# Patient Record
Sex: FEMALE | Race: BLACK OR AFRICAN AMERICAN
[De-identification: names, ages, dates, MRNs, and addresses within clinical notes are randomized per-mention and may not be internally consistent; named-entity substitution may affect disease eponyms.]

---
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
## 2020-06-05 ENCOUNTER — HOSPITAL ENCOUNTER (EMERGENCY)
Dept: HOSPITAL 63 - ER | Age: 20
Discharge: HOME | End: 2020-06-05
Payer: COMMERCIAL

## 2020-06-05 VITALS — HEIGHT: 70 IN | WEIGHT: 293 LBS | BODY MASS INDEX: 41.95 KG/M2

## 2020-06-05 VITALS
DIASTOLIC BLOOD PRESSURE: 80 MMHG | DIASTOLIC BLOOD PRESSURE: 80 MMHG | SYSTOLIC BLOOD PRESSURE: 145 MMHG | SYSTOLIC BLOOD PRESSURE: 145 MMHG | SYSTOLIC BLOOD PRESSURE: 145 MMHG | DIASTOLIC BLOOD PRESSURE: 80 MMHG

## 2020-06-05 DIAGNOSIS — O26.891: Primary | ICD-10-CM

## 2020-06-05 DIAGNOSIS — R11.0: ICD-10-CM

## 2020-06-05 DIAGNOSIS — F12.10: ICD-10-CM

## 2020-06-05 DIAGNOSIS — O99.211: ICD-10-CM

## 2020-06-05 DIAGNOSIS — R19.7: ICD-10-CM

## 2020-06-05 DIAGNOSIS — O99.331: ICD-10-CM

## 2020-06-05 DIAGNOSIS — O99.321: ICD-10-CM

## 2020-06-05 DIAGNOSIS — E66.01: ICD-10-CM

## 2020-06-05 DIAGNOSIS — R10.13: ICD-10-CM

## 2020-06-05 DIAGNOSIS — Z3A.01: ICD-10-CM

## 2020-06-05 LAB
ALBUMIN SERPL-MCNC: 3 G/DL (ref 3.4–5)
ALP SERPL-CCNC: 76 U/L (ref 46–116)
ALT SERPL-CCNC: 22 U/L (ref 14–59)
AMPHETAMINE/METHAMPHETAMINE: (no result)
ANION GAP SERPL CALC-SCNC: 10 MMOL/L (ref 6–14)
APTT PPP: YELLOW S
AST SERPL-CCNC: 17 U/L (ref 15–37)
BACTERIA #/AREA URNS HPF: (no result) /HPF
BARBITURATES UR-MCNC: (no result) UG/ML
BASOPHILS # BLD AUTO: 0 X10^3/UL (ref 0–0.2)
BASOPHILS NFR BLD: 0 % (ref 0–3)
BENZODIAZ UR-MCNC: (no result) UG/L
BILIRUB DIRECT SERPL-MCNC: 0.1 MG/DL (ref 0–0.2)
BILIRUB SERPL-MCNC: 0.3 MG/DL (ref 0.2–1)
BILIRUB UR QL STRIP: (no result)
CA-I SERPL ISE-MCNC: 5 MG/DL (ref 7–20)
CALCIUM SERPL-MCNC: 8.8 MG/DL (ref 8.5–10.1)
CANNABINOIDS UR-MCNC: (no result) UG/L
CHLORIDE SERPL-SCNC: 102 MMOL/L (ref 98–107)
CO2 SERPL-SCNC: 25 MMOL/L (ref 21–32)
COCAINE UR-MCNC: (no result) NG/ML
CREAT SERPL-MCNC: 0.8 MG/DL (ref 0.6–1)
EOSINOPHIL NFR BLD: 0.1 X10^3/UL (ref 0–0.7)
EOSINOPHIL NFR BLD: 1 % (ref 0–3)
ERYTHROCYTE [DISTWIDTH] IN BLOOD BY AUTOMATED COUNT: 15.5 % (ref 11.5–14.5)
FIBRINOGEN PPP-MCNC: (no result) MG/DL
GFR SERPLBLD BASED ON 1.73 SQ M-ARVRAT: 110.7 ML/MIN
GLUCOSE SERPL-MCNC: 96 MG/DL (ref 70–99)
GLUCOSE UR STRIP-MCNC: (no result) MG/DL
HCT VFR BLD CALC: 38.7 % (ref 36–47)
HGB BLD-MCNC: 12.7 G/DL (ref 12–15.5)
LIPASE: 29 U/L (ref 73–393)
LYMPHOCYTES # BLD: 2.6 X10^3/UL (ref 1–4.8)
LYMPHOCYTES NFR BLD AUTO: 26 % (ref 24–48)
MCH RBC QN AUTO: 28 PG (ref 25–35)
MCHC RBC AUTO-ENTMCNC: 33 G/DL (ref 31–37)
MCV RBC AUTO: 84 FL (ref 79–100)
METHADONE SERPL-MCNC: (no result) NG/ML
MONO #: 0.8 X10^3/UL (ref 0–1.1)
MONOCYTES NFR BLD: 8 % (ref 0–9)
NEUT #: 6.7 X10^3UL (ref 1.8–7.7)
NEUTROPHILS NFR BLD AUTO: 66 % (ref 31–73)
NITRITE UR QL STRIP: (no result)
OPIATES UR-MCNC: (no result) NG/ML
PCP SERPL-MCNC: (no result) MG/DL
PLATELET # BLD AUTO: 344 X10^3/UL (ref 140–400)
POTASSIUM SERPL-SCNC: 3.6 MMOL/L (ref 3.5–5.1)
PROT SERPL-MCNC: 7.2 G/DL (ref 6.4–8.2)
RBC # BLD AUTO: 4.62 X10^6/UL (ref 3.5–5.4)
RBC #/AREA URNS HPF: 0 /HPF (ref 0–2)
SODIUM SERPL-SCNC: 137 MMOL/L (ref 136–145)
SP GR UR STRIP: 1.01
SQUAMOUS #/AREA URNS LPF: (no result) /LPF
UROBILINOGEN UR-MCNC: 0.2 MG/DL
WBC # BLD AUTO: 10.1 X10^3/UL (ref 4–11)
WBC #/AREA URNS HPF: (no result) /HPF (ref 0–4)

## 2020-06-05 PROCEDURE — 86901 BLOOD TYPING SEROLOGIC RH(D): CPT

## 2020-06-05 PROCEDURE — 93005 ELECTROCARDIOGRAM TRACING: CPT

## 2020-06-05 PROCEDURE — 96361 HYDRATE IV INFUSION ADD-ON: CPT

## 2020-06-05 PROCEDURE — 85610 PROTHROMBIN TIME: CPT

## 2020-06-05 PROCEDURE — 84702 CHORIONIC GONADOTROPIN TEST: CPT

## 2020-06-05 PROCEDURE — 81001 URINALYSIS AUTO W/SCOPE: CPT

## 2020-06-05 PROCEDURE — 76817 TRANSVAGINAL US OBSTETRIC: CPT

## 2020-06-05 PROCEDURE — 83690 ASSAY OF LIPASE: CPT

## 2020-06-05 PROCEDURE — 36415 COLL VENOUS BLD VENIPUNCTURE: CPT

## 2020-06-05 PROCEDURE — 86900 BLOOD TYPING SEROLOGIC ABO: CPT

## 2020-06-05 PROCEDURE — 82550 ASSAY OF CK (CPK): CPT

## 2020-06-05 PROCEDURE — 81025 URINE PREGNANCY TEST: CPT

## 2020-06-05 PROCEDURE — 80307 DRUG TEST PRSMV CHEM ANLYZR: CPT

## 2020-06-05 PROCEDURE — 96375 TX/PRO/DX INJ NEW DRUG ADDON: CPT

## 2020-06-05 PROCEDURE — 80048 BASIC METABOLIC PNL TOTAL CA: CPT

## 2020-06-05 PROCEDURE — 80076 HEPATIC FUNCTION PANEL: CPT

## 2020-06-05 PROCEDURE — 96374 THER/PROPH/DIAG INJ IV PUSH: CPT

## 2020-06-05 PROCEDURE — 99285 EMERGENCY DEPT VISIT HI MDM: CPT

## 2020-06-05 PROCEDURE — 85025 COMPLETE CBC W/AUTO DIFF WBC: CPT

## 2020-06-05 PROCEDURE — 84484 ASSAY OF TROPONIN QUANT: CPT

## 2020-06-05 NOTE — PHYS DOC
Past History


Past Medical History:  No Pertinent History, Heart Disease


Past Surgical History:  No Surgical History


Alcohol Use:  None


Drug Use:  None





General Adult


EDM:


Chief Complaint:  ABDOMINAL PAIN





HPI:


HPI:


"... I ve been sick since Thursday...  Maybe diarrhea 3 x. day.. really 

nauseated.. feel like I need to vomit... My period is 2 weeks late... I hurt 

right up here in my Epigastric area...








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 

complaints epigastric pain for the last 2 days.  No history of bad food and 

intake.  No history of trauma.  No history of ill contacts.  No  

immunosuppression.  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 

individuals.  No recent travel outside [facility].  No history 

immunosuppression.  Patient states she is late on her period for approximately 6

weeks.





Review of Systems:


Review of Systems:


Constitutional:  Denies fever or chills 


Eyes:  Denies change in visual acuity 


HENT:  Denies nasal congestion or sore throat 


Respiratory:  Denies cough or shortness of breath 


Cardiovascular:  Denies chest pain or edema 


GI:  Complaints abdominal pain, nausea, and  diarrhea 


: Denies dysuria 


Musculoskeletal:  Denies back pain or joint pain 


Integument:  Denies rash 


Neurologic:  Denies headache, focal weakness or sensory changes 


Endocrine:  Denies polyuria or polydipsia 


Lymphatic:  Denies swollen glands 


Psychiatric:  Denies depression or anxiety





Heart Score:


HEART Score for Chest Pain:  








HEART Score for Chest Pain Response (Comments) Value


 


History Slighlty/Non-Suspicious 0


 


ECG Normal 0


 


Age < 45 0


 


Risk Factors No Risk Factors 0


 


Troponin < Normal Limit 0


 


Total  0








Risk Factors:


Risk Factors:  DM, Current or recent (<one month) smoker, HTN, HLP, family 

history of CAD, obesity.


Risk Scores:


Score 0 - 3:  2.5% MACE over next 6 weeks - Discharge Home


Score 4 - 6:  20.3% MACE over next 6 weeks - Admit for Clinical Observation


Score 7 - 10:  72.7% MACE over next 6 weeks - Early Invasive Strategies





Family History:


Family History:


Noncontributory





Current Medications:


Current Meds:


See nursing for home meds





Allergies:


Allergies:





Allergies








Coded Allergies Type Severity Reaction Last Updated Verified


 


  No Known Drug Allergies    19 No











Physical Exam:


PE:





Constitutional: Moderate acute distress, non-toxic appearance. []


HENT: Normocephalic, atraumatic, bilateral external ears normal, oropharynx 

moist, no oral exudates, nose normal. []


Eyes: PERRLA, EOMI, conjunctiva normal, no discharge. [] 


Neck: Normal range of motion, no tenderness, supple, no stridor. [] 


Cardiovascular: Tachycardia heart rate regular rhythm, no murmur []


Lungs & Thorax:  Bilateral breath sounds clear to auscultation []


Abdomen: Bowel sounds hyperactive, soft, epigastric and right upper quadrant te

nderness, no masses, no pulsatile masses.  Morbidly obese.


Skin: Warm, dry, no erythema, no rash. [] 


Back: No tenderness, no CVA tenderness. [] 


Extremities: No tenderness, no cyanosis, no clubbing, ROM intact, ankle edema. 

[] No cording appreciated


Neurologic: Alert and oriented X 3, normal motor function, normal sensory 

function, no focal deficits noted. []


Psychologic: Affect anxious,, judgement normal, mood normal. []





EKG:


EKG:


My interpretation of EKG shows a sinus rhythm at 93 bpm.  No findings of acute 

STEMI with contralateral changes.  []





Radiology/Procedures:


Radiology/Procedures:


[][facility] [address], [place] [address]


                                 [phone]


                                        


                                 IMAGING REPORT





                                     Signed





PATIENT: RONNY SHANE ACCOUNT: [id number]     MRN#: [id number]


: 1974           LOCATION: ER              AGE: 46


SEX: F                    EXAM DT: 20         ACCESSION#: [id number]


STATUS: PRE ER            ORD. PHYSICIAN: LUTHER ENNIS MD


REASON: cough


PROCEDURE: CHEST PA & LATERAL





EXAM: AP View of the chest


 


DATE: 2020 4:17 AM


 


INDICATION: Cough


 


COMPARISON: 2020


 


FINDINGS:


The heart is not enlarged.


 


Mediastinal and hilar contours are stable.


 


Patchy right lung base parenchymal opacities may represent atelectasis or 


developing consolidation, more prominent than 2020.


 


No pleural effusion or pneumothorax.


 


 


 


IMPRESSION:


Patchy right lung base parenchymal opacities may represent atelectasis or 


developing consolidation, more prominent than 2020.


 


Electronically signed by: Abdiaziz Sanchez MD (2020 5:09 AM) [facility]














DICTATED AND SIGNED BY:     ABDIAZIZ SANCHEZ MD


DATE:     20 0509





CC: LUTHER ENNIS MD; PCP,NO ~





Course & Med Decision Making:


Course & Med Decision Making


Pertinent Labs and Imaging studies reviewed. (See chart for details)





Patient follow-up labs with primary care.  Patient push fluids.  Patient take a 

prenatal vitamin.  Patient follow-up OB.  Patient recommended not to smoke or 

use marijuana.  Patient be on a clear fluid diet for the next 24 hours.  Avoid 

solids and milks to allow bowel rest.  Patient push clear fluids and fruit 

juices.  Must follow-up.  





Impression-





1.  Intrauterine pregnancy approximately 6 weeks


2.  Complaints of nausea


3.  Blood type is O+


4.  Hemoglobin 12.7


5.  Beta-hCG is 15,774


6.  Tobacco and marijuana use


7.  Morbid obesity


8.  History of diarrhea








[]





Dragon Disclaimer:


Dragon Disclaimer:


This electronic medical record was generated, in whole or in part, using a voice

 recognition dictation system.





Departure


Departure:


Disposition:  01 HOME/RESIDENCE PRIOR TO ADM


Condition:  STABLE


Referrals:  


PCP,ELEONORA (PCP)





Justification of Admission:


Justification of Admission:


Justification of Admission Dx:  N/A





Dragon Disclaimer


This chart was dictated in whole or in part using Voice Recognition software in 

a busy, high-work load, and often noisy Emergency Department environment.  It 

may contain unintended and wholly unrecognized errors or omissions.











LUTHER ENNIS MD            2020 18:24

## 2020-06-05 NOTE — RAD
CLINICAL HISTORY: Reason: pain, nausea / Spl. Instructions:  / History: 

 

COMPARISON: None available.

 

TECHNIQUE:  Endovaginal sonography was performed

 

FINDINGS: 

An intrauterine  gestational sac is present.  A yolk sac is identified. 

Equivocal fetal pole with cardiac activity is identified, heart rate 

measures approximately 93 beats per minute. There is no subchorionic fluid

collection.

 

Based on a crown rump length averaging 0.34 cm, the estimated gestational 

age is 6 weeks, 0 days.  Estimated date of delivery is 1/29/2021.

 

The ovaries are not visualized. There is small volume pelvic free fluid.

 

IMPRESSION: 

1. A gestational sac with yolk sac is identified. Equivocal fetal pole is 

identified with fetal heart rate of 93 bpm, bradycardia. Recommend short 

normal follow-up ultrasound in one week and follow-up serial hCG. 

Estimated gestational age is 6 weeks 0 days with an estimated date of 

delivery 1/29/2021.

 

Electronically signed by: Abdiaziz Sanchez MD (6/5/2020 11:07 PM) [facility]

## 2020-06-08 NOTE — EKG
[facility] [address], [place] [address]

Test Date:    2020               Test Time:    18:29:59

Pat Name:     YOEL SUAREZ        Department:   

Patient ID:   [id number]           Room:          

Gender:                                Technician:   

:          2000               Requested By: LUTHER ENNIS

Order Number: [id number]            Reading MD:   Charbel Ricci MD

                                 Measurements

Intervals                              Axis          

Rate:                                  P:            

MO:                                    QRS:          

QRSD:                                  T:            

QT:                                                  

QTc:                                                 

                           Interpretive Statements

SR

NON-SPECIFIC ST/T CHANGES



Electronically Signed On 2020 11:14:27 CDT by Charbel Ricci MD

## 2021-02-12 VITALS — DIASTOLIC BLOOD PRESSURE: 76 MMHG | SYSTOLIC BLOOD PRESSURE: 119 MMHG

## 2022-01-10 ENCOUNTER — HOSPITAL ENCOUNTER (OUTPATIENT)
Dept: HOSPITAL 61 - LAB | Age: 22
End: 2022-01-10
Attending: OBSTETRICS & GYNECOLOGY
Payer: COMMERCIAL

## 2022-01-10 DIAGNOSIS — O09.71: Primary | ICD-10-CM

## 2022-01-10 LAB
ALBUMIN SERPL-MCNC: 3.2 G/DL (ref 3.4–5)
ALBUMIN/GLOB SERPL: 0.8 {RATIO} (ref 1–1.7)
ALP SERPL-CCNC: 73 U/L (ref 46–116)
ALT SERPL-CCNC: 21 U/L (ref 14–59)
ANION GAP SERPL CALC-SCNC: 11 MMOL/L (ref 6–14)
AST SERPL-CCNC: 13 U/L (ref 15–37)
BILIRUB SERPL-MCNC: 0.1 MG/DL (ref 0.2–1)
BUN SERPL-MCNC: 8 MG/DL (ref 7–20)
BUN/CREAT SERPL: 11 (ref 6–20)
CALCIUM SERPL-MCNC: 8.7 MG/DL (ref 8.5–10.1)
CHLORIDE SERPL-SCNC: 102 MMOL/L (ref 98–107)
CO2 SERPL-SCNC: 22 MMOL/L (ref 21–32)
CREAT SERPL-MCNC: 0.7 MG/DL (ref 0.6–1)
CREATININE,RANDOM URINE: 505.4 MG/DL
ERYTHROCYTE [DISTWIDTH] IN BLOOD BY AUTOMATED COUNT: 15.3 % (ref 11.5–14.5)
GFR SERPLBLD BASED ON 1.73 SQ M-ARVRAT: 127.8 ML/MIN
GLUCOSE SERPL-MCNC: 77 MG/DL (ref 70–99)
HCT VFR BLD CALC: 38.4 % (ref 36–47)
HGB BLD-MCNC: 12.5 G/DL (ref 12–15.5)
LDH SERPL L TO P-CCNC: 184 U/L (ref 81–234)
MCH RBC QN AUTO: 27 PG (ref 25–35)
MCHC RBC AUTO-ENTMCNC: 33 G/DL (ref 31–37)
MCV RBC AUTO: 83 FL (ref 79–100)
PLATELET # BLD AUTO: 295 X10^3/UL (ref 140–400)
POTASSIUM SERPL-SCNC: 4 MMOL/L (ref 3.5–5.1)
PROT SERPL-MCNC: 7.4 G/DL (ref 6.4–8.2)
RBC # BLD AUTO: 4.65 X10^6/UL (ref 3.5–5.4)
SODIUM SERPL-SCNC: 135 MMOL/L (ref 136–145)
URATE SERPL-MCNC: 3.3 MG/DL (ref 2.6–6)
WBC # BLD AUTO: 9 X10^3/UL (ref 4–11)

## 2022-01-10 PROCEDURE — 86787 VARICELLA-ZOSTER ANTIBODY: CPT

## 2022-01-10 PROCEDURE — 86803 HEPATITIS C AB TEST: CPT

## 2022-01-10 PROCEDURE — 86592 SYPHILIS TEST NON-TREP QUAL: CPT

## 2022-01-10 PROCEDURE — 87340 HEPATITIS B SURFACE AG IA: CPT

## 2022-01-10 PROCEDURE — 82570 ASSAY OF URINE CREATININE: CPT

## 2022-01-10 PROCEDURE — 86850 RBC ANTIBODY SCREEN: CPT

## 2022-01-10 PROCEDURE — 84156 ASSAY OF PROTEIN URINE: CPT

## 2022-01-10 PROCEDURE — 85660 RBC SICKLE CELL TEST: CPT

## 2022-01-10 PROCEDURE — 86762 RUBELLA ANTIBODY: CPT

## 2022-01-10 PROCEDURE — 86703 HIV-1/HIV-2 1 RESULT ANTBDY: CPT

## 2022-01-10 PROCEDURE — 83615 LACTATE (LD) (LDH) ENZYME: CPT

## 2022-01-10 PROCEDURE — 84550 ASSAY OF BLOOD/URIC ACID: CPT

## 2022-01-10 PROCEDURE — 36415 COLL VENOUS BLD VENIPUNCTURE: CPT

## 2022-01-10 PROCEDURE — 85027 COMPLETE CBC AUTOMATED: CPT

## 2022-01-10 PROCEDURE — 80053 COMPREHEN METABOLIC PANEL: CPT

## 2022-01-10 PROCEDURE — 86900 BLOOD TYPING SEROLOGIC ABO: CPT

## 2022-01-10 PROCEDURE — 86901 BLOOD TYPING SEROLOGIC RH(D): CPT

## 2022-01-11 LAB — RUBELLA IGG ANTIBODY: 1.06 INDEX

## 2022-02-04 ENCOUNTER — HOSPITAL ENCOUNTER (OUTPATIENT)
Dept: HOSPITAL 63 - US | Age: 22
End: 2022-02-04
Payer: COMMERCIAL

## 2022-02-04 DIAGNOSIS — O09.72: Primary | ICD-10-CM

## 2022-02-04 DIAGNOSIS — Z3A.14: ICD-10-CM

## 2022-02-04 DIAGNOSIS — O44.02: ICD-10-CM

## 2022-02-04 PROCEDURE — 76801 OB US < 14 WKS SINGLE FETUS: CPT

## 2022-02-04 NOTE — RAD
EXAMINATION:  US OB <14 WKS, 2/4/2022 11:25 AM



CLINICAL INDICATION:  Supervision of high risk pregnancy 



TECHNIQUE: Grayscale, color and spectral Doppler ultrasound images of the pelvis via first trimester 
OB protocol



COMPARISON:  None. 



FINDINGS:  

The uterus measures 13.5 x 8.7 x 7.1 cm. There is an intrauterine pregnancy with crown-rump length of
 8.08 cm, consistent with gestational age 14 weeks 0 days. Fetal heart rate is 163 bpm. Placenta is p
osterior. There is placenta previa.



The right ovary measures 2.8 x 2.2 x 1.9 cm. The left ovary measures 3.6 x 2.4 x 2.2 cm. Normal ovari
an blood flow bilaterally. No adnexal mass or free fluid.



IMPRESSION: 

1. Single living intrauterine pregnancy with gestational age by ultrasound 14 weeks 0 days. Fetal hea
rt rate is 163 bpm. 



2. Placenta previa. Recommend attention on follow-up ultrasound.



Electronically signed by: Didi Weston MD (2/4/2022 1:16 PM) [facility]

## 2022-03-22 ENCOUNTER — HOSPITAL ENCOUNTER (OUTPATIENT)
Dept: HOSPITAL 63 - US | Age: 22
End: 2022-03-22
Payer: COMMERCIAL

## 2022-03-22 DIAGNOSIS — Z34.92: Primary | ICD-10-CM

## 2022-03-22 DIAGNOSIS — Z3A.20: ICD-10-CM

## 2022-03-22 PROCEDURE — 76805 OB US >/= 14 WKS SNGL FETUS: CPT

## 2022-03-23 NOTE — RAD
US OB >14 WEEKS



History: Reason: 2ND TRIMESTER SCAN / Spl. Instructions:  / History: 



Comparison: February 4, 2022



Technique: Multiple grayscale images, color Doppler, and M-mode images of the uterus are obtained.



Findings:



There is a single intrauterine gestation in variable presentation. The placenta is posterior in locat
ion without evidence of placenta previa. The amount of amniotic fluid appears appropriate.  Amniotic 
fluid index is 19.4 cm. Cervical length is 4 cm.  



Biometrical data:



BPD = 4.7 cm for 20 weeks 2 days.

HC   = 18 cm for 20 weeks 3 days.

AC   = 15.7 cm for 20 weeks 6 days.

FL    = 3.6 cm for  21 weeks 4 days.

HC/AC ratio = 1.1.



Overall, the estimated sonographic gestational age is 20 weeks 6 days for an estimated date of delive
ry of August 3, 2022. The estimated date of delivery provided by the last menstrual period is August 8, 2022.  Estimated fetal weight is 14 ounces



A 4 chamber heart is identified with positive cardiac activity. The estimated fetal heart rate is 155
 beats per minute. 



Bilateral upper and lower extremities are identified. 



There is a three-vessel cord with cord insertion visualized. 



Fetal stomach and urinary bladder are identified. Degraded evaluation of the kidneys.



The fetal spine and brain are unremarkable. 



No gross fetal anatomic abnormalities are identified.



Impression:

1.  Single intrauterine gestation with estimated gestational age 20 weeks 6 days.



Electronically signed by: Roel Naranjo DO (3/23/2022 10:21 AM) [id number]